# Patient Record
Sex: FEMALE | Race: BLACK OR AFRICAN AMERICAN | Employment: OTHER | URBAN - METROPOLITAN AREA
[De-identification: names, ages, dates, MRNs, and addresses within clinical notes are randomized per-mention and may not be internally consistent; named-entity substitution may affect disease eponyms.]

---

## 2020-02-17 ENCOUNTER — TELEPHONE (OUTPATIENT)
Dept: HEMATOLOGY | Age: 70
End: 2020-02-17

## 2020-02-17 NOTE — TELEPHONE ENCOUNTER
I called and made this patient an appt with Dr. Abdullahi Helm at [facility] at [facility] on 3/12/20 at 2:30pm.  I gave her directions to the office and instructed her to bring her photo ID, list of meds and insurance card to this appt. She verbalized understanding and confirmed this appt.     Electronically signed by Blade Mackey RN on 2/17/2020 at 1:39 PM

## 2020-03-11 ENCOUNTER — TELEPHONE (OUTPATIENT)
Dept: HEMATOLOGY | Age: 70
End: 2020-03-11

## 2020-03-24 ENCOUNTER — TELEPHONE (OUTPATIENT)
Dept: HEMATOLOGY | Age: 70
End: 2020-03-24

## 2020-03-24 NOTE — TELEPHONE ENCOUNTER
Due to the Coronavirus pandemic patient's appt has been changed. I called the patient and shared with her that her appt  3/26/2020 at 2:15 pm, has been moved to 5/28/2020 at 1:15 pm.  I shared with her that she will receive a call regarding directions to the office closer to the appt. Patient confirmed.         Electronically signed by Otto Hallman on 3/24/20 at 8:51 AM EDT

## 2020-06-25 ENCOUNTER — OFFICE VISIT (OUTPATIENT)
Dept: HEMATOLOGY | Age: 70
End: 2020-06-25
Payer: MEDICARE

## 2020-06-25 VITALS
BODY MASS INDEX: 46.8 KG/M2 | DIASTOLIC BLOOD PRESSURE: 70 MMHG | HEIGHT: 65 IN | OXYGEN SATURATION: 93 % | TEMPERATURE: 97 F | SYSTOLIC BLOOD PRESSURE: 151 MMHG | HEART RATE: 92 BPM | WEIGHT: 280.9 LBS

## 2020-06-25 PROCEDURE — 99999 PR OFFICE/OUTPT VISIT,PROCEDURE ONLY: CPT | Performed by: TRANSPLANT SURGERY

## 2020-06-25 PROCEDURE — 99202 OFFICE O/P NEW SF 15 MIN: CPT | Performed by: TRANSPLANT SURGERY

## 2020-06-25 SDOH — HEALTH STABILITY: MENTAL HEALTH: HOW OFTEN DO YOU HAVE A DRINK CONTAINING ALCOHOL?: MONTHLY OR LESS

## 2020-06-25 SDOH — HEALTH STABILITY: MENTAL HEALTH: HOW MANY STANDARD DRINKS CONTAINING ALCOHOL DO YOU HAVE ON A TYPICAL DAY?: 1 OR 2

## 2020-06-25 NOTE — PROGRESS NOTES
Hepatobiliary and Pancreatic Surgery Attending History and Physical    Patient's Name/Date of Birth: Angelique Lockhart /1950 (9 y.o.)    Date: June 25, 2020     CC: age related screening colonoscopy    HPI:  Patient is a very dillon 81year old female whom has never had a colonoscopy. She denies any change in bowel habits. Her stools are normal in caliber and she moves her bowels daily. She denies any weightloss. She does have a family history of various cancers but is unsure if any had colon cancer. History reviewed. No pertinent past medical history. History reviewed. No pertinent surgical history. No current outpatient medications on file. No current facility-administered medications for this visit.         No Known Allergies    Family History   Problem Relation Age of Onset    Cancer Sister        Social History     Socioeconomic History    Marital status: Unknown     Spouse name: Not on file    Number of children: Not on file    Years of education: Not on file    Highest education level: Not on file   Occupational History    Not on file   Social Needs    Financial resource strain: Not on file    Food insecurity     Worry: Not on file     Inability: Not on file    Transportation needs     Medical: Not on file     Non-medical: Not on file   Tobacco Use    Smoking status: Never Smoker    Smokeless tobacco: Never Used   Substance and Sexual Activity    Alcohol use: Yes     Frequency: Monthly or less     Drinks per session: 1 or 2     Binge frequency: Never    Drug use: Never    Sexual activity: Not on file   Lifestyle    Physical activity     Days per week: Not on file     Minutes per session: Not on file    Stress: Not on file   Relationships    Social connections     Talks on phone: Not on file     Gets together: Not on file     Attends Orthodoxy service: Not on file     Active member of club or organization: Not on file     Attends meetings of clubs or organizations: Not on file     Relationship status: Not on file    Intimate partner violence     Fear of current or ex partner: Not on file     Emotionally abused: Not on file     Physically abused: Not on file     Forced sexual activity: Not on file   Other Topics Concern    Not on file   Social History Narrative    Not on file       ROS:   Review of Systems    Physical Exam:  Vitals:    06/25/20 1444   BP: (!) 151/70   Pulse: 92   Temp: 97 °F (36.1 °C)   SpO2: 93%       PSYCH: mood and affect normal, alert and oriented x 3  CONSTITUTIONAL: No apparent distress, comfortable  EYES: Sclera white, pupils equal round and reactive to light  ENMT:  Hearing normal, trachea midline, ears externally intact  LYMPH: no lympadenopathy in neck. Nolympadenopathy in groins  RESP: Breath sounds were clear and equal with no rales, wheezes, or rhonchi. Respiratory effort was normal with no retractions or use of accessory muscles. CV: Heart soundswere normal with a regular rate and rhythm. No pedal edema  GI/ Abdomen: Soft, nondistended, nontender, no guarding, no peritoneal signs  MSK: no clubbing/ no cyanosis/ gaitnormal       Assessment/Plan:  Age related screening colon cancer  - Patient and family were made aware of the risks, benefits, alternatives, and complications of a Total colonoscopy with possible biopsy, polypectomy, or clipping and wish to proceed with surgery. 30 Minutes of which greater than 50% was spent counseling or coordinating her care. Thank you for the consultation allowing me to take part in Ms. Robert' care. Please send a copy of my note to Dr. Ellen Rushing.  Oscar Mello M.D.  6/25/2020  2:55 PM

## 2020-06-26 ENCOUNTER — TELEPHONE (OUTPATIENT)
Dept: HEMATOLOGY | Age: 70
End: 2020-06-26

## 2020-08-04 ENCOUNTER — HOSPITAL ENCOUNTER (OUTPATIENT)
Age: 70
Discharge: HOME | End: 2020-08-06
Payer: MEDICARE

## 2020-08-04 PROCEDURE — U0003 INFECTIOUS AGENT DETECTION BY NUCLEIC ACID (DNA OR RNA); SEVERE ACUTE RESPIRATORY SYNDROME CORONAVIRUS 2 (SARS-COV-2) (CORONAVIRUS DISEASE [COVID-19]), AMPLIFIED PROBE TECHNIQUE, MAKING USE OF HIGH THROUGHPUT TECHNOLOGIES AS DESCRIBED BY CMS-2020-01-R: HCPCS

## 2020-08-06 LAB
SARS-COV-2: NOT DETECTED
SOURCE: NORMAL

## 2020-08-06 NOTE — PROGRESS NOTES
Cr PRE-ADMISSION TESTING INSTRUCTIONS    The Preadmission Testing patient is instructed accordingly using the following criteria (check applicable):    ARRIVAL INSTRUCTIONS:  [x] Parking the day of Surgery is located in the Main Entrance lot. Upon entering the door, make an immediate right to the surgery reception desk    [] [id number] is available Monday through Friday 6 am to 6 pm    [x] Bring photo ID and insurance card    [] Bring in a copy of Living will or Durable Power of  papers. [x] Please be sure to arrange for responsible adult to provide transportation to and from the hospital    [x] Please arrange for responsible adult to be with you for the 24 hour period post procedure due to having anesthesia      GENERAL INSTRUCTIONS:    [x] Nothing by mouth after midnight, including gum, candy, mints or water    [x] You may brush your teeth, but do not swallow any water    [] Take medications as instructed with 1-2 oz of water    [] Stop herbal supplements and vitamins 5 days prior to procedure    [] Follow preop dosing of blood thinners per physician instructions    [] Take 1/2 dose of evening insulin, but no insulin after midnight    [] No oral diabetic medications after midnight    [] If diabetic and have low blood sugar or feel symptomatic, take 1-2oz apple juice only    [] Bring inhalers day of surgery    [] Bring C-PAP/ Bi-Pap day of surgery    [] Bring urine specimen day of surgery    [x]   no lotion, powders or creams     [x] Follow bowel prep as instructed per surgeon    [] No tobacco products within 24 hours of surgery     [x] No alcohol or illegal drug use within 24 hours of surgery.     [x] Jewelry, body piercing's, eyeglasses, contact lenses and dentures are not permitted into surgery (bring cases)      [x] Please do not wear any nail polish, make up or hair products on the day of surgery    [x] If not already done, you can expect a call from registration    [x] You can expect a call the business day prior to procedure to notify you if your arrival time changes    [x] If you receive a survey after surgery we would greatly appreciate your comments    [] Parent/guardian of a minor must accompany their child and remain on the premises  the entire time they are under our care     [] Pediatric patients may bring favorite toy, blanket or comfort item with them    [] A caregiver or family member must remain with the patient during their stay if they are mentally handicapped, have dementia, disoriented or unable to use a call light or would be a safety concern if left unattended    [x] Please notify surgeon if you develop any illness between now and time of surgery (cold, cough, sore throat, fever, nausea, vomiting) or any signs of infections  including skin, wounds, and dental.    [x]  The Outpatient Pharmacy is available to fill your prescription here on your day of surgery, ask your preop nurse for details    [] Other instructions  EDUCATIONAL MATERIALS PROVIDED:    [] PAT Preoperative Education Packet/Booklet     [] Medication List    [] Fluoroscopy Information Pamphlet    [] Transfusion bracelet applied with instructions    [] Joint replacement video reviewed    [] Shower with soap, lather and rinse well, and use CHG wipes provided the evening before surgery as instructed

## 2020-08-06 NOTE — PROGRESS NOTES
Have you been tested for COVID yes preop tested 8/4/2020        Have you been told you were positive for COVID No  Have you had any known exposure to someone that is positive for COVID No  Do you have a cough                   No              Do you have shortness of breath No                 Do you have a sore throat            No                Are you having chills                    No                Are you having muscle aches. No                    Please come to the hospital wearing a mask and have your significant other wear a mask as well. Both of you should check your temperature before leaving to come here,  if it is 100 or higher please call [phone] for instruction.

## 2020-08-10 ENCOUNTER — HOSPITAL ENCOUNTER (OUTPATIENT)
Age: 70
Setting detail: OUTPATIENT SURGERY
Discharge: HOME | End: 2020-08-10
Attending: TRANSPLANT SURGERY | Admitting: TRANSPLANT SURGERY
Payer: MEDICARE

## 2020-08-10 ENCOUNTER — ANESTHESIA (OUTPATIENT)
Dept: ENDOSCOPY | Age: 70
End: 2020-08-10
Payer: MEDICARE

## 2020-08-10 ENCOUNTER — ANESTHESIA EVENT (OUTPATIENT)
Dept: ENDOSCOPY | Age: 70
End: 2020-08-10
Payer: MEDICARE

## 2020-08-10 VITALS
HEIGHT: 65 IN | OXYGEN SATURATION: 100 % | DIASTOLIC BLOOD PRESSURE: 82 MMHG | TEMPERATURE: 96.8 F | SYSTOLIC BLOOD PRESSURE: 148 MMHG | HEART RATE: 82 BPM | RESPIRATION RATE: 18 BRPM | BODY MASS INDEX: 46.65 KG/M2 | WEIGHT: 280 LBS

## 2020-08-10 VITALS
DIASTOLIC BLOOD PRESSURE: 69 MMHG | OXYGEN SATURATION: 98 % | RESPIRATION RATE: 28 BRPM | SYSTOLIC BLOOD PRESSURE: 144 MMHG

## 2020-08-10 PROBLEM — Z12.11 ENCOUNTER FOR SCREENING COLONOSCOPY: Status: ACTIVE | Noted: 2020-08-10

## 2020-08-10 PROCEDURE — 2580000003 HC RX 258: Performed by: NURSE ANESTHETIST, CERTIFIED REGISTERED

## 2020-08-10 PROCEDURE — 3700000000 HC ANESTHESIA ATTENDED CARE: Performed by: TRANSPLANT SURGERY

## 2020-08-10 PROCEDURE — 2709999900 HC NON-CHARGEABLE SUPPLY: Performed by: TRANSPLANT SURGERY

## 2020-08-10 PROCEDURE — 3609010300 HC COLONOSCOPY W/BIOPSY SINGLE/MULTIPLE: Performed by: TRANSPLANT SURGERY

## 2020-08-10 PROCEDURE — 45380 COLONOSCOPY AND BIOPSY: CPT | Performed by: TRANSPLANT SURGERY

## 2020-08-10 PROCEDURE — 88305 TISSUE EXAM BY PATHOLOGIST: CPT

## 2020-08-10 PROCEDURE — 7100000011 HC PHASE II RECOVERY - ADDTL 15 MIN: Performed by: TRANSPLANT SURGERY

## 2020-08-10 PROCEDURE — 3700000001 HC ADD 15 MINUTES (ANESTHESIA): Performed by: TRANSPLANT SURGERY

## 2020-08-10 PROCEDURE — 6360000002 HC RX W HCPCS: Performed by: NURSE ANESTHETIST, CERTIFIED REGISTERED

## 2020-08-10 PROCEDURE — 7100000010 HC PHASE II RECOVERY - FIRST 15 MIN: Performed by: TRANSPLANT SURGERY

## 2020-08-10 RX ORDER — SODIUM CHLORIDE 0.9 % (FLUSH) 0.9 %
10 SYRINGE (ML) INJECTION EVERY 12 HOURS SCHEDULED
Status: DISCONTINUED | OUTPATIENT
Start: 2020-08-10 | End: 2020-08-10 | Stop reason: HOSPADM

## 2020-08-10 RX ORDER — PROPOFOL 10 MG/ML
INJECTION, EMULSION INTRAVENOUS CONTINUOUS PRN
Status: DISCONTINUED | OUTPATIENT
Start: 2020-08-10 | End: 2020-08-10 | Stop reason: SDUPTHER

## 2020-08-10 RX ORDER — SODIUM CHLORIDE 0.9 % (FLUSH) 0.9 %
10 SYRINGE (ML) INJECTION PRN
Status: DISCONTINUED | OUTPATIENT
Start: 2020-08-10 | End: 2020-08-10 | Stop reason: HOSPADM

## 2020-08-10 RX ORDER — SODIUM CHLORIDE 9 MG/ML
INJECTION, SOLUTION INTRAVENOUS CONTINUOUS PRN
Status: DISCONTINUED | OUTPATIENT
Start: 2020-08-10 | End: 2020-08-10 | Stop reason: SDUPTHER

## 2020-08-10 RX ADMIN — SODIUM CHLORIDE: 9 INJECTION, SOLUTION INTRAVENOUS at 10:45

## 2020-08-10 RX ADMIN — PROPOFOL 175 MCG/KG/MIN: 10 INJECTION, EMULSION INTRAVENOUS at 10:53

## 2020-08-10 ASSESSMENT — LIFESTYLE VARIABLES: SMOKING_STATUS: 0

## 2020-08-10 ASSESSMENT — PAIN - FUNCTIONAL ASSESSMENT: PAIN_FUNCTIONAL_ASSESSMENT: 0-10

## 2020-08-10 NOTE — PROGRESS NOTES
Patient awake and alert, no complaints of pain, nausea or vomiting. Discharge instructions reviewed with patient and responsible person. Sedation form provided in instructions. Questions answered. Patient will be discharged home with responsible adult.

## 2020-08-10 NOTE — H&P
Hepatobiliary and Pancreatic Surgery Attending History and Physical     Patient's Name/Date of Birth: Francis Cagle /1950 (19 y.o.)     Date: August 10, 2020      CC: age related screening colonoscopy     HPI:  Patient is a very dillon 81year old female whom has never had a colonoscopy. She denies any change in bowel habits. Her stools are normal in caliber and she moves her bowels daily. She denies any weightloss. She does have a family history of various cancers but is unsure if any had colon cancer.          Past Medical History   History reviewed. No pertinent past medical history.        Past Surgical History   History reviewed. No pertinent surgical history.        Current Facility-Administered Medications   No current outpatient medications on file.      No current facility-administered medications for this visit.            No Known Allergies     Family History         Family History   Problem Relation Age of Onset    Cancer Sister             Social History               Socioeconomic History    Marital status: Unknown       Spouse name: Not on file    Number of children: Not on file    Years of education: Not on file    Highest education level: Not on file   Occupational History    Not on file   Social Needs    Financial resource strain: Not on file    Food insecurity       Worry: Not on file       Inability: Not on file    Transportation needs       Medical: Not on file       Non-medical: Not on file   Tobacco Use    Smoking status: Never Smoker    Smokeless tobacco: Never Used   Substance and Sexual Activity    Alcohol use:  Yes       Frequency: Monthly or less       Drinks per session: 1 or 2       Binge frequency: Never    Drug use: Never    Sexual activity: Not on file   Lifestyle    Physical activity       Days per week: Not on file       Minutes per session: Not on file    Stress: Not on file   Relationships    Social connections       Talks on phone: Not on file       Gets together: Not on file       Attends Christian service: Not on file       Active member of club or organization: Not on file       Attends meetings of clubs or organizations: Not on file       Relationship status: Not on file    Intimate partner violence       Fear of current or ex partner: Not on file       Emotionally abused: Not on file       Physically abused: Not on file       Forced sexual activity: Not on file   Other Topics Concern    Not on file   Social History Narrative    Not on file           ROS:   Review of Systems     Physical Exam:      Vitals:     06/25/20 1444   BP: (!) 151/70   Pulse: 92   Temp: 97 °F (36.1 °C)   SpO2: 93%        PSYCH: mood and affect normal, alert and oriented x 3  CONSTITUTIONAL: No apparent distress, comfortable  EYES: Sclera white, pupils equal round and reactive to light  ENMT:  Hearing normal, trachea midline, ears externally intact  LYMPH: no lympadenopathy in neck. Nolympadenopathy in groins  RESP: Breath sounds were clear and equal with no rales, wheezes, or rhonchi. Respiratory effort was normal with no retractions or use of accessory muscles. CV: Heart soundswere normal with a regular rate and rhythm. No pedal edema  GI/ Abdomen: Soft, nondistended, nontender, no guarding, no peritoneal signs  MSK: no clubbing/ no cyanosis/ gaitnormal        Assessment/Plan:  Age related screening colon cancer  - Patient and family were made aware of the risks, benefits, alternatives, and complications of a Total colonoscopy with possible biopsy, polypectomy, or clipping and wish to proceed with surgery.       30 Minutes of which greater than 50% was spent counseling or coordinating her care.     Thank you for the consultation allowing me to take part in Ms. Robert' care.      Please send a copy of my note to Dr. Juan Pablo Nunez  Electronically signed by Carlene Davis MD on 8/10/2020 at 7:02 AM

## 2020-08-10 NOTE — OP NOTE
See Dr. Renzo Hawthorne op note    Electronically signed by Francisco Sims MD on 8/18/2020 at 11:46 AM

## 2020-08-10 NOTE — ANESTHESIA PRE PROCEDURE
consumption: 08/09/20                        Date of last solid food consumption: 08/09/20    BMI:   Wt Readings from Last 3 Encounters:   08/10/20 280 lb (127 kg)   06/25/20 280 lb 14.4 oz (127.4 kg)     Body mass index is 46.59 kg/m². CBC: No results found for: WBC, RBC, HGB, HCT, MCV, RDW, PLT    CMP: No results found for: NA, K, CL, CO2, BUN, CREATININE, GFRAA, AGRATIO, LABGLOM, GLUCOSE, PROT, CALCIUM, BILITOT, ALKPHOS, AST, ALT    POC Tests: No results for input(s): POCGLU, POCNA, POCK, POCCL, POCBUN, POCHEMO, POCHCT in the last 72 hours. Coags: No results found for: PROTIME, INR, APTT    HCG (If Applicable): No results found for: PREGTESTUR, PREGSERUM, HCG, HCGQUANT     ABGs: No results found for: PHART, PO2ART, YPZ7FHQ, FFO4EJO, BEART, Y7LYTRMO     Type & Screen (If Applicable):  No results found for: LABABO, LABRH    Drug/Infectious Status (If Applicable):  No results found for: HIV, HEPCAB    COVID-19 Screening (If Applicable):   Lab Results   Component Value Date    COVID19 Not Detected 08/04/2020         Anesthesia Evaluation  Patient summary reviewed and Nursing notes reviewed no history of anesthetic complications:   Airway: Mallampati: II  TM distance: >3 FB   Neck ROM: full  Mouth opening: > = 3 FB Dental:    (+) upper dentures      Pulmonary:Negative Pulmonary ROS breath sounds clear to auscultation      (-) not a current smoker                           Cardiovascular:Negative CV ROS  Exercise tolerance: good (>4 METS),       (-) dysrhythmias and  angina      Rhythm: regular  Rate: normal           Beta Blocker:  Not on Beta Blocker         Neuro/Psych:   Negative Neuro/Psych ROS              GI/Hepatic/Renal:   (+) bowel prep, morbid obesity          Endo/Other:    (+) : arthritis:., .                 Abdominal:           Vascular: negative vascular ROS.                                        Anesthesia Plan      MAC     ASA 3     (Possible tooth loose/aspiration)  Induction: intravenous. Anesthetic plan and risks discussed with patient and child/children.                       Nasrin Martin MD   8/10/2020

## 2020-09-09 PROBLEM — Z12.11 ENCOUNTER FOR SCREENING COLONOSCOPY: Status: RESOLVED | Noted: 2020-08-10 | Resolved: 2020-09-09

## 2022-09-27 ENCOUNTER — HOSPITAL ENCOUNTER (OUTPATIENT)
Dept: MAMMOGRAPHY | Age: 72
Discharge: HOME | End: 2022-09-29
Payer: MEDICARE

## 2022-09-27 DIAGNOSIS — Z78.0 POSTMENOPAUSAL STATE: ICD-10-CM

## 2022-09-27 PROCEDURE — 77080 DXA BONE DENSITY AXIAL: CPT

## (undated) DEVICE — FORCEPS BX L240CM JAW DIA2.4MM ORNG L CAP W/ NDL DISP RAD

## (undated) DEVICE — GRADUATE TRIANG MEASURE 1000ML BLK PRNT